# Patient Record
Sex: FEMALE
[De-identification: names, ages, dates, MRNs, and addresses within clinical notes are randomized per-mention and may not be internally consistent; named-entity substitution may affect disease eponyms.]

---

## 2020-08-27 NOTE — HISTORY AND PHYSICAL REPORT
History of Present Illness


Date of examination: 20


Chief complaint: 





repeat c/s + BTL


History of present illness: 





20 to  at 39w c/b prior c/s x 2, hx PPROM at 35 weeks on 17-OHP, 

ASCUS/HPV neg, elevated 1hr GTT, nl 3hr GTT, Class II Obesity presenting for 

repeat c/s + BTL.  Desires permanent sterilization.  Denies labor complaints or 

PIH symptoms. +FM.





Past History


Past Surgical History:  section (x2)


GYN History: abnormal PAP smear (ACUS/HPV neg)


Family/Genetic History: diabetes


Social history: no significant social history





- Obstetrical History


: 3


Para: 2


Hx # Term Pregnancies: 1


Number of  Pregnancies: 1


Number of Living Children: 2





Medications and Allergies


                                    Allergies











Allergy/AdvReac Type Severity Reaction Status Date / Time


 


No Known Allergies Allergy   Unverified 19 22:46











                                Home Medications











 Medication  Instructions  Recorded  Confirmed  Last Taken  Type


 


Pantoprazole [Protonix TAB] 40 mg PO QDAY #30 tablet 19  Unknown Rx














Review of Systems


All systems: negative (expect HPI)





- Physical Exam


Breasts: Positive: deferred


Abdomen: Positive: normal appearance, other (gravid)





- Obstetrical


FHR: category 1





Results


All other labs normal.








Assessment and Plan





- Patient Problems


(1) S/P 


Status: Acute   


Plan to address problem: 





--For repeat C/s + BTL


--Counselled and consented in the chart

## 2020-09-03 ENCOUNTER — HOSPITAL ENCOUNTER (INPATIENT)
Dept: HOSPITAL 5 - APU | Age: 20
LOS: 3 days | Discharge: HOME | End: 2020-09-06
Attending: OBSTETRICS & GYNECOLOGY | Admitting: OBSTETRICS & GYNECOLOGY
Payer: COMMERCIAL

## 2020-09-03 DIAGNOSIS — Z03.818: ICD-10-CM

## 2020-09-03 DIAGNOSIS — E66.9: ICD-10-CM

## 2020-09-03 DIAGNOSIS — O34.211: ICD-10-CM

## 2020-09-03 DIAGNOSIS — Z83.3: ICD-10-CM

## 2020-09-03 DIAGNOSIS — Z3A.39: ICD-10-CM

## 2020-09-03 LAB
BASOPHILS # (AUTO): 0.1 K/MM3 (ref 0–0.1)
BASOPHILS NFR BLD AUTO: 0.7 % (ref 0–1.8)
EOSINOPHIL # BLD AUTO: 0 K/MM3 (ref 0–0.4)
EOSINOPHIL NFR BLD AUTO: 0.5 % (ref 0–4.3)
HCT VFR BLD CALC: 33.5 % (ref 30.3–42.9)
HCT VFR BLD CALC: 35.7 % (ref 30.3–42.9)
HGB BLD-MCNC: 11.9 GM/DL (ref 10.1–14.3)
HGB BLD-MCNC: 12.5 GM/DL (ref 10.1–14.3)
LYMPHOCYTES # BLD AUTO: 1.8 K/MM3 (ref 1.2–5.4)
LYMPHOCYTES NFR BLD AUTO: 24.5 % (ref 13.4–35)
MCHC RBC AUTO-ENTMCNC: 35 % (ref 30–34)
MCHC RBC AUTO-ENTMCNC: 35 % (ref 30–34)
MCV RBC AUTO: 88 FL (ref 79–97)
MCV RBC AUTO: 88 FL (ref 79–97)
MONOCYTES # (AUTO): 0.6 K/MM3 (ref 0–0.8)
MONOCYTES % (AUTO): 8.1 % (ref 0–7.3)
PLATELET # BLD: 273 K/MM3 (ref 140–440)
PLATELET # BLD: 299 K/MM3 (ref 140–440)
RBC # BLD AUTO: 3.82 M/MM3 (ref 3.65–5.03)
RBC # BLD AUTO: 4.06 M/MM3 (ref 3.65–5.03)

## 2020-09-03 PROCEDURE — 85027 COMPLETE CBC AUTOMATED: CPT

## 2020-09-03 PROCEDURE — 86592 SYPHILIS TEST NON-TREP QUAL: CPT

## 2020-09-03 PROCEDURE — 88302 TISSUE EXAM BY PATHOLOGIST: CPT

## 2020-09-03 PROCEDURE — A6250 SKIN SEAL PROTECT MOISTURIZR: HCPCS

## 2020-09-03 PROCEDURE — 85014 HEMATOCRIT: CPT

## 2020-09-03 PROCEDURE — 86900 BLOOD TYPING SEROLOGIC ABO: CPT

## 2020-09-03 PROCEDURE — 86901 BLOOD TYPING SEROLOGIC RH(D): CPT

## 2020-09-03 PROCEDURE — 85018 HEMOGLOBIN: CPT

## 2020-09-03 PROCEDURE — 0UB70ZZ EXCISION OF BILATERAL FALLOPIAN TUBES, OPEN APPROACH: ICD-10-PCS | Performed by: OBSTETRICS & GYNECOLOGY

## 2020-09-03 PROCEDURE — 86850 RBC ANTIBODY SCREEN: CPT

## 2020-09-03 PROCEDURE — 85025 COMPLETE CBC W/AUTO DIFF WBC: CPT

## 2020-09-03 PROCEDURE — 36415 COLL VENOUS BLD VENIPUNCTURE: CPT

## 2020-09-03 RX ADMIN — KETOROLAC TROMETHAMINE SCH MG: 30 INJECTION, SOLUTION INTRAMUSCULAR at 17:45

## 2020-09-03 RX ADMIN — KETOROLAC TROMETHAMINE SCH MG: 30 INJECTION, SOLUTION INTRAMUSCULAR at 23:58

## 2020-09-03 NOTE — ANESTHESIA CONSULTATION
Anesthesia Consult and Med Hx


Date of service: 09/03/20





- Airway


Anesthetic Teeth Evaluation: Good


ROM Head & Neck: Adequate


Mental/Hyoid Distance: Adequate


Mallampati Class: Class II


Intubation Access Assessment: Probably Good





- Pulmonary Exam


CTA: Yes





- Cardiac Exam


Cardiac Exam: RRR





- Pre-Operative Health Status


ASA Pre-Surgery Classification: ASA2


Proposed Anesthetic Plan: Spinal





- Pulmonary


Hx Asthma: No


COPD: No


Hx Pneumonia: No





- Cardiovascular System


Hx Hypertension: No





- Central Nervous System


Hx Seizures: No


Hx Psychiatric Problems: No





- Endocrine


Hx Renal Disease: No


Hx End Stage Renal Disease: No


Hx Liver Disease: Yes (Elevated LFTs)


Hx Hypothyroidism: No


Hx Hyperthyroidism: No





- Hematic


Hx Anemia: No


Hx Sickle Cell Disease: No





- Other Systems


Hx Alcohol Use: No

## 2020-09-03 NOTE — PROCEDURE NOTE
OB Delivery Note





- Delivery


Date of Delivery: 20


Surgeon: PRIMO SMITH JR


Estimated blood loss: other (1200)





-  Section


Preop diagnosis: desires sterilization


Postop diagnosis: same


 section procedure:  section, repeat low transverse, bilateral 

tubal ligation


Disposition: PACU


Complications: none


Narrative: 





Indication: 20-year-old G3, P2 at 39 weeks presenting for repeat  and 

permanent sterilization.





Findings: Normal uterus, tubes and ovaries.  Clear fluid.  No nuchal cord.





Female


Apgars 8/9


3859 g





EBL 1200 


urine output 100 


IV fluids 2000





Procedure: Patient was taken to the operating room prepped and draped in the 

usual sterile fashion.  Pfannenstiel skin incision was made and carried down to 

the underlying fascia.  Fascia was incised and the incision was distended 

bilaterally.  Rectus fascia was dissected off the rectus muscle superiorly and 

inferiorly.  Peritoneum was identified and entered.  Peritoneal incision 

extended superiorly and inferiorly.  The bladder was visualized.  The bladder 

blade was placed.  Uterine hysterotomy incision was made and extended 

bilaterally. The baby was delivered in the typical vertex fashion.  Baby was 

bulb suction at delivery.  The cord was cut and clamped and handed off to the 

 team. The placenta was delivered spontaneously.  The uterus was 

exteriorized and cleared of all clots and debris.  Uterine incision was closed 

with a 0 Vicryl in a running locked fashion.  Good hemostasis was noted.  The 

urine was noted to be clear.  Uterus, tubes, and ovaries were returned to the 

abdominal cavity.  Bilateral gutters were cleared and the abdomen and pelvis 

were irrigated.  Good hemostasis noted.  Next a bilateral tubal ligation was 

completed.  Starting with the right fallopian tube, the tube was grabbed with a 

Dmitry clamp and the mesosalpinx was opened with a hemostat.  2-0 chromic 

suture was used to tie each end of the medial and then lateral end of the tube 

using the [place] fashion.  The suture was then cut.  The tube was then removed

using Metzenbaum scissors.  Same procedure performed on contralateral side with 

hemostasis noted. Attention was directed towards the rectus fascia which was 

reapproximated with 0 PDS in a running fashion.  The subcutaneous tissue was 

irrigated and reapproximated with 2-0 Vicryl in a running fashion.  Skin was 

closed with a 4-0 Vicryl in a subcuticular fashion.  The procedure was completed

and the patient tolerated the procedure well.  All instruments and lap counts 

were correct x2.





- Infant


  ** A


Apgar at 1 minute: 8


Apgar at 5 minutes: 9


Infant Gender: Female

## 2020-09-04 LAB
HCT VFR BLD CALC: 32.7 % (ref 30.3–42.9)
HGB BLD-MCNC: 11.1 GM/DL (ref 10.1–14.3)

## 2020-09-04 RX ADMIN — KETOROLAC TROMETHAMINE SCH MG: 30 INJECTION, SOLUTION INTRAMUSCULAR at 06:37

## 2020-09-04 RX ADMIN — OXYCODONE AND ACETAMINOPHEN PRN TAB: 5; 325 TABLET ORAL at 14:54

## 2020-09-04 RX ADMIN — OXYCODONE AND ACETAMINOPHEN PRN TAB: 5; 325 TABLET ORAL at 22:54

## 2020-09-04 RX ADMIN — KETOROLAC TROMETHAMINE SCH MG: 30 INJECTION, SOLUTION INTRAMUSCULAR at 10:04

## 2020-09-05 RX ADMIN — OXYCODONE AND ACETAMINOPHEN PRN TAB: 5; 325 TABLET ORAL at 10:36

## 2020-09-05 RX ADMIN — IBUPROFEN PRN MG: 800 TABLET, FILM COATED ORAL at 05:27

## 2020-09-05 RX ADMIN — OXYCODONE AND ACETAMINOPHEN PRN TAB: 5; 325 TABLET ORAL at 17:55

## 2020-09-05 RX ADMIN — IBUPROFEN PRN MG: 800 TABLET, FILM COATED ORAL at 21:32

## 2020-09-06 VITALS — SYSTOLIC BLOOD PRESSURE: 122 MMHG | DIASTOLIC BLOOD PRESSURE: 73 MMHG

## 2020-09-06 RX ADMIN — IBUPROFEN PRN MG: 800 TABLET, FILM COATED ORAL at 04:59

## 2021-12-29 NOTE — DISCHARGE SUMMARY
Providers





- Providers


Date of Admission: 


20 08:56





Date of discharge: 20


Attending physician: 


PRIMO SMITH JR, MD





Primary care physician: 


OTILIO VALENZUELA MD








Hospitalization


Reason for admission:  section


Delivery: 


Procedure:  section, bilateral tubal ligation


Postpartum complications: none


Discharge diagnosis: IUP at term delivered


 baby: female


Pertinent studies: 


Labs





Hospital course: 


Stable hospital course. 





Condition at discharge: Good


Disposition: DC-01 TO HOME OR SELFCARE





- Discharge Diagnoses


(1) Term pregnancy delivered


Status: Acute   





(2) Anemia


Status: Acute   





Plan





- Discharge Medications


Prescriptions: 


Ibuprofen [Motrin 800 MG tab] 800 mg PO Q6H PRN #30 tablet


 PRN Reason: Pain, Mild (1-3)


oxyCODONE /ACETAMINOPHEN [Percocet 5/325 mg] 1 tab PO Q6H PRN #30 tablet


 PRN Reason: Pain, Moderate (4-6)





- Provider Discharge Summary


Activity: routine, no sex for 6 weeks, no heavy lifting 4 weeks


Diet: routine


Instructions: routine


Additional instructions: 


Continue taking your prenatal vitamins and iron supplements at home. 


Call your doctor immediately for:


* Fever > 100.5


* Heavy vaginal bleeding ( >1 pad per hour)


* Severe persistent headache


* Shortness of breath


* Reddened, hot, painful area to leg or breast


* Drainage or odor from incision.





* Keep incision clean and dry at all times and follow doctor's instructions 

regarding bathing/showering











- Follow up plan


Follow up: 


PRIMO SMITH JR, MD [Staff Physician] - 7 Days


Forms:  [facility] Discharge Summary Name band;